# Patient Record
Sex: FEMALE | Race: OTHER | HISPANIC OR LATINO
[De-identification: names, ages, dates, MRNs, and addresses within clinical notes are randomized per-mention and may not be internally consistent; named-entity substitution may affect disease eponyms.]

---

## 2023-02-22 PROBLEM — Z00.00 ENCOUNTER FOR PREVENTIVE HEALTH EXAMINATION: Status: ACTIVE | Noted: 2023-02-22

## 2023-02-27 ENCOUNTER — APPOINTMENT (OUTPATIENT)
Dept: SURGERY | Facility: CLINIC | Age: 23
End: 2023-02-27
Payer: MEDICAID

## 2023-02-27 VITALS
SYSTOLIC BLOOD PRESSURE: 103 MMHG | HEART RATE: 69 BPM | HEIGHT: 61 IN | BODY MASS INDEX: 25.68 KG/M2 | WEIGHT: 136 LBS | DIASTOLIC BLOOD PRESSURE: 69 MMHG

## 2023-02-27 DIAGNOSIS — Z56.0 UNEMPLOYMENT, UNSPECIFIED: ICD-10-CM

## 2023-02-27 DIAGNOSIS — K64.8 OTHER HEMORRHOIDS: ICD-10-CM

## 2023-02-27 DIAGNOSIS — Z80.49 FAMILY HISTORY OF MALIGNANT NEOPLASM OF OTHER GENITAL ORGANS: ICD-10-CM

## 2023-02-27 DIAGNOSIS — Z80.3 FAMILY HISTORY OF MALIGNANT NEOPLASM OF BREAST: ICD-10-CM

## 2023-02-27 DIAGNOSIS — Z78.9 OTHER SPECIFIED HEALTH STATUS: ICD-10-CM

## 2023-02-27 DIAGNOSIS — K92.1 MELENA: ICD-10-CM

## 2023-02-27 DIAGNOSIS — Z80.0 FAMILY HISTORY OF MALIGNANT NEOPLASM OF DIGESTIVE ORGANS: ICD-10-CM

## 2023-02-27 PROCEDURE — 99203 OFFICE O/P NEW LOW 30 MIN: CPT

## 2023-02-27 RX ORDER — FLUTICASONE PROPIONATE 50 UG/1
50 SPRAY, METERED NASAL
Qty: 16 | Refills: 0 | Status: ACTIVE | COMMUNITY
Start: 2022-10-14

## 2023-02-27 RX ORDER — CIPROFLOXACIN HYDROCHLORIDE 250 MG/1
250 TABLET, FILM COATED ORAL
Qty: 10 | Refills: 0 | Status: ACTIVE | COMMUNITY
Start: 2023-02-21

## 2023-02-27 RX ORDER — AMOXICILLIN AND CLAVULANATE POTASSIUM 875; 125 MG/1; MG/1
875-125 TABLET, COATED ORAL
Qty: 14 | Refills: 0 | Status: ACTIVE | COMMUNITY
Start: 2022-12-30

## 2023-02-27 RX ORDER — SENNOSIDES 8.6 MG TABLETS 8.6 MG/1
8.6 TABLET ORAL
Qty: 30 | Refills: 0 | Status: ACTIVE | COMMUNITY
Start: 2023-02-21

## 2023-02-27 RX ORDER — METRONIDAZOLE 250 MG/1
250 TABLET ORAL
Qty: 15 | Refills: 0 | Status: ACTIVE | COMMUNITY
Start: 2023-02-21

## 2023-02-27 RX ORDER — CETIRIZINE HYDROCHLORIDE 10 MG/1
10 TABLET, COATED ORAL
Qty: 20 | Refills: 0 | Status: ACTIVE | COMMUNITY
Start: 2022-10-14

## 2023-02-27 RX ORDER — ACETIC ACID 20 MG/ML
2 SOLUTION AURICULAR (OTIC)
Qty: 15 | Refills: 0 | Status: ACTIVE | COMMUNITY
Start: 2022-12-30

## 2023-02-27 SDOH — ECONOMIC STABILITY - INCOME SECURITY: UNEMPLOYMENT, UNSPECIFIED: Z56.0

## 2023-02-27 NOTE — HISTORY OF PRESENT ILLNESS
[de-identified] : Ms. FELIX KIRKPATRICK  is 22 year  old  female referred by Dr Witt with the chief complaint having  hemorrhoids .   She reports this condition since her pregnancy 1 year ago.     Ms. KIRKPATRICK denies rectal pain. She reports seeing blood with BM on multiple occasions.  She denies   any nausea, vomiting or fevers.   Patient reports good   appetite. Patient  denies  weight loss or fatigue.  patient reports  good bowel movements  .  Ms. KIRKPATRICK  reports  family history of  ulcerative colitis in her father. \par \par \par

## 2023-02-27 NOTE — PLAN
[FreeTextEntry1] : Ms. KIRKPATRICK  was told significance of findings, options, risks and benefits were explained.   All surgical options were discussed including non-surgical treatments.    due to the family history of UC and blood per rectum she was advised to have a colonoscopy before preceding with any surgery. \par Patient advised to seek immediate medical attention with any acute change in symptoms or with the development of any new or worsening symptoms.  Patient's questions and concerns addressed to patient's satisfaction, and patient verbalized an understanding of the information discussed.\par \par

## 2023-02-27 NOTE — CONSULT LETTER
[Dear  ___] : Dear  [unfilled], [Consult Letter:] : I had the pleasure of evaluating your patient, [unfilled]. [Please see my note below.] : Please see my note below. [Consult Closing:] : Thank you very much for allowing me to participate in the care of this patient.  If you have any questions, please do not hesitate to contact me. [Sincerely,] : Sincerely, [FreeTextEntry3] : Ankit Gutierrez MD, FACS

## 2023-02-27 NOTE — PHYSICAL EXAM
[Alert] : alert [Oriented to Person] : oriented to person [Oriented to Place] : oriented to place [Oriented to Time] : oriented to time [Calm] : calm [de-identified] : She  is alert, well-groomed, and in NAD\par   [de-identified] : anicteric.  Nasal mucosa pink, septum midline. Oral mucosa pink.  Tongue midline, Pharynx without exudates.\par   [de-identified] : Neck supple. Trachea midline. Thyroid isthmus barely palpable, lobes not felt.\par   [de-identified] : deferred

## 2023-07-08 ENCOUNTER — EMERGENCY (EMERGENCY)
Facility: HOSPITAL | Age: 23
LOS: 0 days | Discharge: HOME | End: 2023-07-08
Attending: STUDENT IN AN ORGANIZED HEALTH CARE EDUCATION/TRAINING PROGRAM
Payer: MEDICAID

## 2023-07-08 VITALS
HEART RATE: 55 BPM | RESPIRATION RATE: 18 BRPM | DIASTOLIC BLOOD PRESSURE: 55 MMHG | OXYGEN SATURATION: 98 % | SYSTOLIC BLOOD PRESSURE: 90 MMHG | TEMPERATURE: 98 F

## 2023-07-08 VITALS
SYSTOLIC BLOOD PRESSURE: 104 MMHG | WEIGHT: 123.02 LBS | TEMPERATURE: 98 F | OXYGEN SATURATION: 100 % | RESPIRATION RATE: 18 BRPM | HEIGHT: 61 IN | DIASTOLIC BLOOD PRESSURE: 62 MMHG | HEART RATE: 62 BPM

## 2023-07-08 DIAGNOSIS — N93.9 ABNORMAL UTERINE AND VAGINAL BLEEDING, UNSPECIFIED: ICD-10-CM

## 2023-07-08 DIAGNOSIS — Z88.1 ALLERGY STATUS TO OTHER ANTIBIOTIC AGENTS STATUS: ICD-10-CM

## 2023-07-08 DIAGNOSIS — R10.30 LOWER ABDOMINAL PAIN, UNSPECIFIED: ICD-10-CM

## 2023-07-08 DIAGNOSIS — R53.83 OTHER FATIGUE: ICD-10-CM

## 2023-07-08 DIAGNOSIS — Z97.5 PRESENCE OF (INTRAUTERINE) CONTRACEPTIVE DEVICE: ICD-10-CM

## 2023-07-08 LAB
ALBUMIN SERPL ELPH-MCNC: 3.8 G/DL — SIGNIFICANT CHANGE UP (ref 3.3–5)
ALP SERPL-CCNC: 77 U/L — SIGNIFICANT CHANGE UP (ref 40–120)
ALT FLD-CCNC: 23 U/L — SIGNIFICANT CHANGE UP (ref 12–78)
ANION GAP SERPL CALC-SCNC: 2 MMOL/L — LOW (ref 5–17)
AST SERPL-CCNC: 14 U/L — LOW (ref 15–37)
BASOPHILS # BLD AUTO: 0.03 K/UL — SIGNIFICANT CHANGE UP (ref 0–0.2)
BASOPHILS NFR BLD AUTO: 0.4 % — SIGNIFICANT CHANGE UP (ref 0–2)
BILIRUB SERPL-MCNC: 0.6 MG/DL — SIGNIFICANT CHANGE UP (ref 0.2–1.2)
BLD GP AB SCN SERPL QL: SIGNIFICANT CHANGE UP
BUN SERPL-MCNC: 15 MG/DL — SIGNIFICANT CHANGE UP (ref 7–23)
CALCIUM SERPL-MCNC: 8.8 MG/DL — SIGNIFICANT CHANGE UP (ref 8.5–10.1)
CHLORIDE SERPL-SCNC: 110 MMOL/L — HIGH (ref 96–108)
CO2 SERPL-SCNC: 27 MMOL/L — SIGNIFICANT CHANGE UP (ref 22–31)
CREAT SERPL-MCNC: 0.64 MG/DL — SIGNIFICANT CHANGE UP (ref 0.5–1.3)
EGFR: 126 ML/MIN/1.73M2 — SIGNIFICANT CHANGE UP
EOSINOPHIL # BLD AUTO: 0.2 K/UL — SIGNIFICANT CHANGE UP (ref 0–0.5)
EOSINOPHIL NFR BLD AUTO: 2.6 % — SIGNIFICANT CHANGE UP (ref 0–6)
GLUCOSE SERPL-MCNC: 98 MG/DL — SIGNIFICANT CHANGE UP (ref 70–99)
HCT VFR BLD CALC: 37.9 % — SIGNIFICANT CHANGE UP (ref 34.5–45)
HGB BLD-MCNC: 12.7 G/DL — SIGNIFICANT CHANGE UP (ref 11.5–15.5)
IMM GRANULOCYTES NFR BLD AUTO: 0.3 % — SIGNIFICANT CHANGE UP (ref 0–0.9)
LYMPHOCYTES # BLD AUTO: 2.15 K/UL — SIGNIFICANT CHANGE UP (ref 1–3.3)
LYMPHOCYTES # BLD AUTO: 27.6 % — SIGNIFICANT CHANGE UP (ref 13–44)
MCHC RBC-ENTMCNC: 29.6 PG — SIGNIFICANT CHANGE UP (ref 27–34)
MCHC RBC-ENTMCNC: 33.5 G/DL — SIGNIFICANT CHANGE UP (ref 32–36)
MCV RBC AUTO: 88.3 FL — SIGNIFICANT CHANGE UP (ref 80–100)
MONOCYTES # BLD AUTO: 0.33 K/UL — SIGNIFICANT CHANGE UP (ref 0–0.9)
MONOCYTES NFR BLD AUTO: 4.2 % — SIGNIFICANT CHANGE UP (ref 2–14)
NEUTROPHILS # BLD AUTO: 5.07 K/UL — SIGNIFICANT CHANGE UP (ref 1.8–7.4)
NEUTROPHILS NFR BLD AUTO: 64.9 % — SIGNIFICANT CHANGE UP (ref 43–77)
NRBC # BLD: 0 /100 WBCS — SIGNIFICANT CHANGE UP (ref 0–0)
PLATELET # BLD AUTO: 240 K/UL — SIGNIFICANT CHANGE UP (ref 150–400)
POTASSIUM SERPL-MCNC: 4.2 MMOL/L — SIGNIFICANT CHANGE UP (ref 3.5–5.3)
POTASSIUM SERPL-SCNC: 4.2 MMOL/L — SIGNIFICANT CHANGE UP (ref 3.5–5.3)
PROT SERPL-MCNC: 7.6 GM/DL — SIGNIFICANT CHANGE UP (ref 6–8.3)
RBC # BLD: 4.29 M/UL — SIGNIFICANT CHANGE UP (ref 3.8–5.2)
RBC # FLD: 13.8 % — SIGNIFICANT CHANGE UP (ref 10.3–14.5)
SODIUM SERPL-SCNC: 139 MMOL/L — SIGNIFICANT CHANGE UP (ref 135–145)
WBC # BLD: 7.8 K/UL — SIGNIFICANT CHANGE UP (ref 3.8–10.5)
WBC # FLD AUTO: 7.8 K/UL — SIGNIFICANT CHANGE UP (ref 3.8–10.5)

## 2023-07-08 PROCEDURE — 99284 EMERGENCY DEPT VISIT MOD MDM: CPT

## 2023-07-08 PROCEDURE — 76856 US EXAM PELVIC COMPLETE: CPT | Mod: 26

## 2023-07-08 RX ORDER — ACETAMINOPHEN 500 MG
650 TABLET ORAL ONCE
Refills: 0 | Status: COMPLETED | OUTPATIENT
Start: 2023-07-08 | End: 2023-07-08

## 2023-07-08 RX ORDER — SODIUM CHLORIDE 9 MG/ML
1000 INJECTION INTRAMUSCULAR; INTRAVENOUS; SUBCUTANEOUS ONCE
Refills: 0 | Status: COMPLETED | OUTPATIENT
Start: 2023-07-08 | End: 2023-07-08

## 2023-07-08 RX ADMIN — Medication 650 MILLIGRAM(S): at 14:39

## 2023-07-08 RX ADMIN — SODIUM CHLORIDE 1000 MILLILITER(S): 9 INJECTION INTRAMUSCULAR; INTRAVENOUS; SUBCUTANEOUS at 16:24

## 2023-07-08 RX ADMIN — Medication 650 MILLIGRAM(S): at 13:40

## 2023-07-08 NOTE — ED PROVIDER NOTE - PHYSICAL EXAMINATION
GEN: Awake, alert, interactive, NAD.  HEAD AND NECK: NC/AT. Airway patent. Neck supple.   EYES:  Clear b/l.   ENT: Moist mucus membranes.   CARDIAC: Regular rate, regular rhythm. No evident pedal edema.    RESP/CHEST: Normal respiratory effort with no use of accessory muscles or retractions. Clear throughout on auscultation.  ABD: soft, non-distended, non-tender. No rebound, no guarding.   BACK: No midline spinal TTP. No CVAT.   EXTREMITIES: Moving all extremities with no apparent deformities.   SKIN: Warm, dry, intact normal color. No rash.   NEURO: AOx3,  no focal deficits.   PSYCH: Appropriate mood and affect.

## 2023-07-08 NOTE — ED PROVIDER NOTE - OBJECTIVE STATEMENT
23 y/o female with no PMH presents with lower abdominal pain x 1 month. Pt reports having vaginal bleeding x 2 months but more constant 1 month ago using about 8-10 pads/day. Pt states she had an IUD placed about 15 months ago, saw a ob/gyn doctor in [facility]  last month and was told it was her IUD that caused her bleeding. Denies nausea, vomiting, urinary symptoms. Pt reports feeling fatigue and tired. LMP 2 months ago. Denies chest pain, dyspnea. Deneis history ovarian cyst.

## 2023-07-08 NOTE — ED PROVIDER NOTE - CLINICAL SUMMARY MEDICAL DECISION MAKING FREE TEXT BOX
21 y/o female with no PMH here with vaginal bleeding x 2 month with lower abdominal cramping.   Will check labs, US to assess for possible fibroids, placement of IUP, tylenol ordered for pain.

## 2023-07-08 NOTE — ED ADULT NURSE NOTE - NSFALLUNIVINTERV_ED_ALL_ED
Bed/Stretcher in lowest position, wheels locked, appropriate side rails in place/Call bell, personal items and telephone in reach/Instruct patient to call for assistance before getting out of bed/chair/stretcher/Non-slip footwear applied when patient is off stretcher/[place] to call system/Physically safe environment - no spills, clutter or unnecessary equipment/Purposeful proactive rounding/Room/bathroom lighting operational, light cord in reach

## 2023-07-08 NOTE — ED PROVIDER NOTE - PATIENT PORTAL LINK FT
You can access the FollowMyHealth Patient Portal offered by [facility] by registering at the following website: [email or web address]. By joining [employer]’s FollowMyHealth portal, you will also be able to view your health information using other applications (apps) compatible with our system.

## 2023-07-08 NOTE — ED PROVIDER NOTE - NSFOLLOWUPINSTRUCTIONS_ED_ALL_ED_FT
Rest, drink plenty of fluids.  Advance activity as tolerated.  Continue all previously prescribed medications as directed.  Follow up with your ob/gyn doctor this week- bring copies of your results.  Return to the ER for worsening or persistent symptoms, and/or ANY NEW OR CONCERNING SYMPTOMS. If you have issues obtaining follow up, please call: [phone] to obtain a doctor or specialist who takes your insurance in your area.  You may call [phone] to make an appointment with the internal medicine clinic.

## 2023-07-08 NOTE — ED ADULT NURSE REASSESSMENT NOTE - NS ED NURSE REASSESS COMMENT FT1
Breathing unlabored on RA. patient resting comfortably in stretcher. No acute or respiratory distress noted. awaiting for ultrasound. Fall and safety precautions maintained.

## 2023-07-08 NOTE — ED ADULT TRIAGE NOTE - CHIEF COMPLAINT QUOTE
patient reports vaginal bleeding  with lower pelvic cramping since LMP May/25/23.  Today she is still bleeding, feels very tired, headaches and intermittent n/v. Has an IUD in place since last May and was told by her OBGYN that it needs to be removed as it might be the source of her bleeding.

## 2023-07-08 NOTE — ED PROVIDER NOTE - CARE PROVIDER_API CALL
Stefan Mayer  Obstetrics and Gynecology  [address] [place] [address]  Phone: [phone]  Fax: [phone]  Follow Up Time: 4-6 Days

## 2023-07-08 NOTE — ED PROVIDER NOTE - PROGRESS NOTE DETAILS
ITZ Burnette: Pt reassessed and reports feeling better.   Labs reviewed H:H normal hcg negative. US reviewed, no acute findings. Pt stable to be discharged home and follow up with ob/gyn doctor.

## 2023-07-08 NOTE — ED ADULT NURSE NOTE - OBJECTIVE STATEMENT
patient is A&Ox4. Breathing unlabored on RA. patient Polish speaking. Mother Ashlee Onofre at bedside to translate. denies any pmh/psh. patient denies pregnancy. LMP 05/25/23. has IUD in place. Patient complaining of lower pelvic cramping and vaginal bleeding since 05/25/23. patient reports having mild vaginal bleeding. states the cramping and bleeding is worsen now. reports using 9 pads a day. reports being seen by OB/GYN in [facility]. states "the doctor said the IUD is too long". states the IUD was placed in [place]. pt complaining of intermittent headaches, fatigue for a couple of days. patient complaining of intermittent episodes of nausea. denies any other symptoms at this time. denies any cp, fever, chills, sob, difficulty breathing, back pain, urinary symptoms, vision changes, dizziness. denies any other symptoms.

## 2024-03-06 ENCOUNTER — TRANSCRIPTION ENCOUNTER (OUTPATIENT)
Age: 24
End: 2024-03-06

## 2024-03-06 ENCOUNTER — LABORATORY RESULT (OUTPATIENT)
Age: 24
End: 2024-03-06

## 2024-03-06 ENCOUNTER — APPOINTMENT (OUTPATIENT)
Dept: OBGYN | Facility: CLINIC | Age: 24
End: 2024-03-06
Payer: MEDICAID

## 2024-03-06 VITALS
HEART RATE: 84 BPM | SYSTOLIC BLOOD PRESSURE: 103 MMHG | HEIGHT: 61 IN | WEIGHT: 123 LBS | DIASTOLIC BLOOD PRESSURE: 69 MMHG | BODY MASS INDEX: 23.22 KG/M2

## 2024-03-06 DIAGNOSIS — Z87.891 PERSONAL HISTORY OF NICOTINE DEPENDENCE: ICD-10-CM

## 2024-03-06 DIAGNOSIS — R10.2 PELVIC AND PERINEAL PAIN: ICD-10-CM

## 2024-03-06 PROBLEM — Z78.9 OTHER SPECIFIED HEALTH STATUS: Chronic | Status: ACTIVE | Noted: 2023-07-08

## 2024-03-06 PROCEDURE — 99204 OFFICE O/P NEW MOD 45 MIN: CPT

## 2024-03-06 PROCEDURE — 99459 PELVIC EXAMINATION: CPT

## 2024-03-06 NOTE — PHYSICAL EXAM
[Chaperone Present] : A chaperone was present in the examining room during all aspects of the physical examination [FreeTextEntry1] : FREDY Flores [Appropriately responsive] : appropriately responsive [Alert] : alert [No Acute Distress] : no acute distress [Regular Rate Rhythm] : regular rate rhythm [Oriented x3] : oriented x3 [FreeTextEntry5] : non labored breathing [Labia Majora] : normal [Labia Minora] : normal [IUD String] : an IUD string was noted [Normal] : normal [Uterine Adnexae] : normal

## 2024-03-06 NOTE — PLAN
[FreeTextEntry1] : 24 y/o P1 LMP 2/17/24 with postcoital bleeding, pelvic pain  Plan: - Pap test collected - IUD strings visualized, referral for pelvic sono to r/o malpositioned IUD - discussed side effects of dysmenorrhea with copper IUD - will f/u results

## 2024-03-06 NOTE — HISTORY OF PRESENT ILLNESS
[FreeTextEntry1] : 22 y/o P1 LMP 24 presents with pelvic pain, AUB. Reports noticing AUB and pelvic pain after intercourse. Has copper IUD, typically has monthly menses that are heavier and sometimes more painful, but this episode of bleeding was unusual. Denies new sexual partners. Denies abnormal vaginal discharge, odor. Otherwise feels well with no concern  OB:  x1 GYN: denies PMH: denies PSH: denies Meds: denies Allergies: NKDA Social: denies toxic habits

## 2024-03-07 ENCOUNTER — APPOINTMENT (OUTPATIENT)
Dept: OBGYN | Facility: CLINIC | Age: 24
End: 2024-03-07
Payer: MEDICAID

## 2024-03-07 ENCOUNTER — ASOB RESULT (OUTPATIENT)
Age: 24
End: 2024-03-07

## 2024-03-07 VITALS
WEIGHT: 120 LBS | DIASTOLIC BLOOD PRESSURE: 69 MMHG | HEART RATE: 66 BPM | SYSTOLIC BLOOD PRESSURE: 107 MMHG | HEIGHT: 61 IN | BODY MASS INDEX: 22.66 KG/M2

## 2024-03-07 DIAGNOSIS — T83.32XA DISPLACEMENT OF INTRAUTERINE CONTRACEPTIVE DEVICE, INITIAL ENCOUNTER: ICD-10-CM

## 2024-03-07 DIAGNOSIS — N93.9 ABNORMAL UTERINE AND VAGINAL BLEEDING, UNSPECIFIED: ICD-10-CM

## 2024-03-07 PROCEDURE — 76830 TRANSVAGINAL US NON-OB: CPT

## 2024-03-07 PROCEDURE — 99214 OFFICE O/P EST MOD 30 MIN: CPT | Mod: 25

## 2024-03-07 PROCEDURE — 99459 PELVIC EXAMINATION: CPT

## 2024-03-07 PROCEDURE — 58301 REMOVE INTRAUTERINE DEVICE: CPT

## 2024-03-07 NOTE — PLAN
[FreeTextEntry1] : 22 y/o with AUB likely 2/2 malpositioned IUD, for IUD removal  Plan: - Counseled patient on IUD removal, patient agrees, consent signed and witnessed - discussed other contraception options, patient is interested in another IUD, possibly Mirena - IUD removed, see separate note, tolerated by patient - RTO for mirena IUD insertion

## 2024-03-07 NOTE — PROCEDURE
[IUD Removal] : intrauterine device (IUD) removal [Time out performed] : Pre-procedure time out performed.  Patient's name, date of birth and procedure confirmed. [Risks] : risks [Consent Obtained] : Consent obtained [Benefits] : benefits [Alternatives] : alternatives [Patient] : patient [Speculum Placed] : speculum placed [Strings Visualized] : strings visualized [IUD Discarded] : IUD discarded [Tolerated Well] : Patient tolerated the procedure well [Heavy Vaginal Bleeding] : for heavy vaginal bleeding [No Complications] : no complications [Pelvic Pain] : for pelvic pain [PRN] : as needed [de-identified] : Malpositioned IUD

## 2024-03-07 NOTE — PHYSICAL EXAM
[Chaperone Present] : A chaperone was present in the examining room during all aspects of the physical examination [FreeTextEntry1] : Josey DANIEL [Appropriately responsive] : appropriately responsive [Alert] : alert [No Acute Distress] : no acute distress [Regular Rate Rhythm] : regular rate rhythm [Oriented x3] : oriented x3 [FreeTextEntry5] : non labored breathing [Labia Majora] : normal [Labia Minora] : normal [Normal] : normal [IUD String] : an IUD string was noted

## 2024-03-07 NOTE — HISTORY OF PRESENT ILLNESS
[FreeTextEntry1] : 22 y/o presents for follow up for AUB, pelvic pain. Has copper IUD. Pelvic sono shows malpositioned IUD with concern for arm in wall of lower uterine segment. Patient desires IUD removal

## 2024-03-07 NOTE — PROCEDURE
[IUD Removal] : intrauterine device (IUD) removal [Time out performed] : Pre-procedure time out performed.  Patient's name, date of birth and procedure confirmed. [Consent Obtained] : Consent obtained [Risks] : risks [Benefits] : benefits [Patient] : patient [Alternatives] : alternatives [Speculum Placed] : speculum placed [Strings Visualized] : strings visualized [IUD Discarded] : IUD discarded [Tolerated Well] : Patient tolerated the procedure well [No Complications] : no complications [Heavy Vaginal Bleeding] : for heavy vaginal bleeding [Pelvic Pain] : for pelvic pain [PRN] : as needed [de-identified] : Malpositioned IUD

## 2024-03-07 NOTE — PLAN
[FreeTextEntry1] : 24 y/o with AUB likely 2/2 malpositioned IUD, for IUD removal  Plan: - Counseled patient on IUD removal, patient agrees, consent signed and witnessed - discussed other contraception options, patient is interested in another IUD, possibly Mirena - IUD removed, see separate note, tolerated by patient - RTO for mirena IUD insertion

## 2024-03-13 LAB — CYTOLOGY CVX/VAG DOC THIN PREP: ABNORMAL

## 2024-03-14 ENCOUNTER — APPOINTMENT (OUTPATIENT)
Dept: OBGYN | Facility: CLINIC | Age: 24
End: 2024-03-14
Payer: MEDICAID

## 2024-03-14 ENCOUNTER — ASOB RESULT (OUTPATIENT)
Age: 24
End: 2024-03-14

## 2024-03-14 VITALS
SYSTOLIC BLOOD PRESSURE: 103 MMHG | HEIGHT: 61 IN | WEIGHT: 122 LBS | BODY MASS INDEX: 23.03 KG/M2 | HEART RATE: 66 BPM | DIASTOLIC BLOOD PRESSURE: 67 MMHG

## 2024-03-14 LAB
HCG UR QL: NEGATIVE
QUALITY CONTROL: YES

## 2024-03-14 PROCEDURE — 76830 TRANSVAGINAL US NON-OB: CPT

## 2024-03-14 PROCEDURE — 58300 INSERT INTRAUTERINE DEVICE: CPT

## 2024-03-14 NOTE — PROCEDURE
[IUD Placement] : intrauterine device (IUD) placement [Time out performed] : Pre-procedure time out performed.  Patient's name, date of birth and procedure confirmed. [Prevention of Pregnancy] : prevention of pregnancy [Consent Obtained] : Consent obtained [Benefits] : benefits [Risks] : risks [Alternatives] : alternatives [Patient] : patient [Infection] : infection [Bleeding] : bleeding [Pain] : pain [Failure] : failure [Expulsion] : expulsion [Uterine Perforation] : uterine perforation [Neg Pregnancy Test] : negative pregnancy test [No Premedication] : No premedication [Betadine] : Betadine [Tenaculum] : Tenaculum [Easy Passage] : Easy passage [Sounded to ___ cm] : sounded to [unfilled] ~Ucm [Mirena IUD] : Mirena IUD [Tolerated Well] : Patient tolerated the procedure well [No Complications] : No complications [None] : None [de-identified] : Lidocaine paracervical block

## 2024-03-15 ENCOUNTER — NON-APPOINTMENT (OUTPATIENT)
Age: 24
End: 2024-03-15

## 2025-05-15 ENCOUNTER — APPOINTMENT (OUTPATIENT)
Dept: OBGYN | Facility: CLINIC | Age: 25
End: 2025-05-15
Payer: COMMERCIAL

## 2025-05-15 ENCOUNTER — ASOB RESULT (OUTPATIENT)
Age: 25
End: 2025-05-15

## 2025-05-15 VITALS
SYSTOLIC BLOOD PRESSURE: 105 MMHG | HEART RATE: 83 BPM | HEIGHT: 61 IN | WEIGHT: 144 LBS | BODY MASS INDEX: 27.19 KG/M2 | DIASTOLIC BLOOD PRESSURE: 72 MMHG

## 2025-05-15 DIAGNOSIS — T83.32XA DISPLACEMENT OF INTRAUTERINE CONTRACEPTIVE DEVICE, INITIAL ENCOUNTER: ICD-10-CM

## 2025-05-15 DIAGNOSIS — N83.209 UNSPECIFIED OVARIAN CYST, UNSPECIFIED SIDE: ICD-10-CM

## 2025-05-15 LAB
HCG UR QL: NEGATIVE
QUALITY CONTROL: YES

## 2025-05-15 PROCEDURE — 99459 PELVIC EXAMINATION: CPT

## 2025-05-15 PROCEDURE — 76830 TRANSVAGINAL US NON-OB: CPT

## 2025-05-15 PROCEDURE — 99214 OFFICE O/P EST MOD 30 MIN: CPT

## 2025-05-16 LAB
HBV SURFACE AG SER QL: NONREACTIVE
HCV AB SER QL: NONREACTIVE
HCV S/CO RATIO: 0.12 S/CO
HIV1+2 AB SPEC QL IA.RAPID: NONREACTIVE
T PALLIDUM AB SER QL IA: NEGATIVE

## 2025-05-19 DIAGNOSIS — N89.8 OTHER SPECIFIED NONINFLAMMATORY DISORDERS OF VAGINA: ICD-10-CM

## 2025-05-19 LAB
BV BACTERIA RRNA VAG QL NAA+PROBE: DETECTED
C GLABRATA RNA VAG QL NAA+PROBE: NOT DETECTED
C TRACH RRNA SPEC QL NAA+PROBE: NOT DETECTED
CANDIDA RRNA VAG QL PROBE: NOT DETECTED
N GONORRHOEA RRNA SPEC QL NAA+PROBE: NOT DETECTED
T VAGINALIS RRNA SPEC QL NAA+PROBE: NOT DETECTED

## 2025-05-19 RX ORDER — METRONIDAZOLE 500 MG/1
500 TABLET ORAL TWICE DAILY
Qty: 14 | Refills: 0 | Status: ACTIVE | COMMUNITY
Start: 2025-05-19

## 2025-09-15 ENCOUNTER — EMERGENCY (EMERGENCY)
Facility: HOSPITAL | Age: 25
LOS: 0 days | Discharge: LEFT BEFORE DISCHARGE | End: 2025-09-15

## 2025-09-15 VITALS
TEMPERATURE: 98 F | HEART RATE: 78 BPM | DIASTOLIC BLOOD PRESSURE: 70 MMHG | OXYGEN SATURATION: 100 % | SYSTOLIC BLOOD PRESSURE: 114 MMHG | RESPIRATION RATE: 16 BRPM | HEIGHT: 64 IN | WEIGHT: 139.99 LBS

## 2025-09-15 DIAGNOSIS — R10.2 PELVIC AND PERINEAL PAIN: ICD-10-CM

## 2025-09-15 DIAGNOSIS — Z53.21 PROCEDURE AND TREATMENT NOT CARRIED OUT DUE TO PATIENT LEAVING PRIOR TO BEING SEEN BY HEALTH CARE PROVIDER: ICD-10-CM

## 2025-09-15 PROCEDURE — L9991: CPT
